# Patient Record
Sex: MALE | Race: WHITE | NOT HISPANIC OR LATINO
[De-identification: names, ages, dates, MRNs, and addresses within clinical notes are randomized per-mention and may not be internally consistent; named-entity substitution may affect disease eponyms.]

---

## 2022-08-24 PROBLEM — Z00.00 ENCOUNTER FOR PREVENTIVE HEALTH EXAMINATION: Status: ACTIVE | Noted: 2022-08-24

## 2022-08-29 ENCOUNTER — APPOINTMENT (OUTPATIENT)
Dept: ELECTROPHYSIOLOGY | Facility: CLINIC | Age: 67
End: 2022-08-29

## 2022-08-29 ENCOUNTER — NON-APPOINTMENT (OUTPATIENT)
Age: 67
End: 2022-08-29

## 2022-08-29 VITALS
SYSTOLIC BLOOD PRESSURE: 158 MMHG | HEIGHT: 72 IN | OXYGEN SATURATION: 96 % | HEART RATE: 59 BPM | DIASTOLIC BLOOD PRESSURE: 80 MMHG | TEMPERATURE: 99.4 F | BODY MASS INDEX: 21.84 KG/M2

## 2022-08-29 VITALS — WEIGHT: 161 LBS

## 2022-08-29 DIAGNOSIS — Z82.49 FAMILY HISTORY OF ISCHEMIC HEART DISEASE AND OTHER DISEASES OF THE CIRCULATORY SYSTEM: ICD-10-CM

## 2022-08-29 DIAGNOSIS — Z87.19 PERSONAL HISTORY OF OTHER DISEASES OF THE DIGESTIVE SYSTEM: ICD-10-CM

## 2022-08-29 DIAGNOSIS — Z85.828 PERSONAL HISTORY OF OTHER MALIGNANT NEOPLASM OF SKIN: ICD-10-CM

## 2022-08-29 DIAGNOSIS — Z72.89 OTHER PROBLEMS RELATED TO LIFESTYLE: ICD-10-CM

## 2022-08-29 DIAGNOSIS — R00.2 PALPITATIONS: ICD-10-CM

## 2022-08-29 PROCEDURE — 99205 OFFICE O/P NEW HI 60 MIN: CPT

## 2022-08-29 PROCEDURE — 93000 ELECTROCARDIOGRAM COMPLETE: CPT

## 2022-08-29 RX ORDER — AMLODIPINE BESYLATE 2.5 MG/1
2.5 TABLET ORAL
Qty: 90 | Refills: 3 | Status: ACTIVE | COMMUNITY
Start: 2022-08-29

## 2022-08-29 RX ORDER — METOPROLOL TARTRATE 50 MG/1
50 TABLET, FILM COATED ORAL TWICE DAILY
Qty: 180 | Refills: 1 | Status: ACTIVE | COMMUNITY
Start: 2022-08-29

## 2022-08-29 RX ORDER — ATORVASTATIN CALCIUM 40 MG/1
40 TABLET, FILM COATED ORAL
Qty: 30 | Refills: 1 | Status: ACTIVE | COMMUNITY
Start: 2022-08-29

## 2022-08-29 RX ORDER — DM/PE/ACETAMINOPHEN/CHLORPHENR 10-5-325-2
500 TABLET, SEQUENTIAL ORAL
Refills: 0 | Status: ACTIVE | COMMUNITY
Start: 2022-08-29

## 2022-08-29 RX ORDER — APIXABAN 5 MG/1
5 TABLET, FILM COATED ORAL
Qty: 60 | Refills: 5 | Status: ACTIVE | COMMUNITY
Start: 2022-08-29

## 2022-08-30 NOTE — HISTORY OF PRESENT ILLNESS
[FreeTextEntry1] : Sal Mayberry is a 67y/o man with Hx of HTN, HLD, and newly found paroxysmal afib, on Eliquis, who presents today for initial evaluation. Admits post COVID vaccine, had recurrent hospitalization for new onset palpitations and dyspnea and was noted to be in afib with RVR. Saw Cardiologist and underwent Holter monitoring which revealed recurrent afib with RVR. On metoprolol 50 BID and Eliquis for thromboembolic prophylaxis. No s/s of bleeding. Still experiencing recurrent palpitations, can wake him from sleep. Of note, had a home sleep test in 2021, normal. Denies chest pain, SOB at rest, syncope or near syncope.\par

## 2022-08-31 ENCOUNTER — OUTPATIENT (OUTPATIENT)
Dept: OUTPATIENT SERVICES | Facility: HOSPITAL | Age: 67
LOS: 1 days | End: 2022-08-31

## 2022-08-31 VITALS
OXYGEN SATURATION: 98 % | SYSTOLIC BLOOD PRESSURE: 140 MMHG | TEMPERATURE: 98 F | WEIGHT: 158.07 LBS | DIASTOLIC BLOOD PRESSURE: 80 MMHG | HEART RATE: 60 BPM | RESPIRATION RATE: 16 BRPM | HEIGHT: 71 IN

## 2022-08-31 DIAGNOSIS — I48.0 PAROXYSMAL ATRIAL FIBRILLATION: ICD-10-CM

## 2022-08-31 DIAGNOSIS — Z98.890 OTHER SPECIFIED POSTPROCEDURAL STATES: Chronic | ICD-10-CM

## 2022-08-31 LAB
ALBUMIN SERPL ELPH-MCNC: 4.9 G/DL — SIGNIFICANT CHANGE UP (ref 3.3–5)
ALP SERPL-CCNC: 61 U/L — SIGNIFICANT CHANGE UP (ref 40–120)
ALT FLD-CCNC: 22 U/L — SIGNIFICANT CHANGE UP (ref 4–41)
ANION GAP SERPL CALC-SCNC: 13 MMOL/L — SIGNIFICANT CHANGE UP (ref 7–14)
AST SERPL-CCNC: 23 U/L — SIGNIFICANT CHANGE UP (ref 4–40)
BILIRUB SERPL-MCNC: 1.1 MG/DL — SIGNIFICANT CHANGE UP (ref 0.2–1.2)
BLD GP AB SCN SERPL QL: NEGATIVE — SIGNIFICANT CHANGE UP
BUN SERPL-MCNC: 17 MG/DL — SIGNIFICANT CHANGE UP (ref 7–23)
CALCIUM SERPL-MCNC: 9.6 MG/DL — SIGNIFICANT CHANGE UP (ref 8.4–10.5)
CHLORIDE SERPL-SCNC: 101 MMOL/L — SIGNIFICANT CHANGE UP (ref 98–107)
CO2 SERPL-SCNC: 25 MMOL/L — SIGNIFICANT CHANGE UP (ref 22–31)
CREAT SERPL-MCNC: 0.69 MG/DL — SIGNIFICANT CHANGE UP (ref 0.5–1.3)
EGFR: 101 ML/MIN/1.73M2 — SIGNIFICANT CHANGE UP
GLUCOSE SERPL-MCNC: 101 MG/DL — HIGH (ref 70–99)
HCT VFR BLD CALC: 45.2 % — SIGNIFICANT CHANGE UP (ref 39–50)
HGB BLD-MCNC: 14.9 G/DL — SIGNIFICANT CHANGE UP (ref 13–17)
MCHC RBC-ENTMCNC: 31.9 PG — SIGNIFICANT CHANGE UP (ref 27–34)
MCHC RBC-ENTMCNC: 33 GM/DL — SIGNIFICANT CHANGE UP (ref 32–36)
MCV RBC AUTO: 96.8 FL — SIGNIFICANT CHANGE UP (ref 80–100)
NRBC # BLD: 0 /100 WBCS — SIGNIFICANT CHANGE UP (ref 0–0)
NRBC # FLD: 0 K/UL — SIGNIFICANT CHANGE UP (ref 0–0)
PLATELET # BLD AUTO: 153 K/UL — SIGNIFICANT CHANGE UP (ref 150–400)
POTASSIUM SERPL-MCNC: 4.2 MMOL/L — SIGNIFICANT CHANGE UP (ref 3.5–5.3)
POTASSIUM SERPL-SCNC: 4.2 MMOL/L — SIGNIFICANT CHANGE UP (ref 3.5–5.3)
PROT SERPL-MCNC: 7.8 G/DL — SIGNIFICANT CHANGE UP (ref 6–8.3)
RBC # BLD: 4.67 M/UL — SIGNIFICANT CHANGE UP (ref 4.2–5.8)
RBC # FLD: 12.3 % — SIGNIFICANT CHANGE UP (ref 10.3–14.5)
RH IG SCN BLD-IMP: POSITIVE — SIGNIFICANT CHANGE UP
SODIUM SERPL-SCNC: 139 MMOL/L — SIGNIFICANT CHANGE UP (ref 135–145)
WBC # BLD: 7.97 K/UL — SIGNIFICANT CHANGE UP (ref 3.8–10.5)
WBC # FLD AUTO: 7.97 K/UL — SIGNIFICANT CHANGE UP (ref 3.8–10.5)

## 2022-08-31 RX ORDER — ATORVASTATIN CALCIUM 80 MG/1
1 TABLET, FILM COATED ORAL
Qty: 0 | Refills: 0 | DISCHARGE

## 2022-08-31 RX ORDER — AMLODIPINE BESYLATE 2.5 MG/1
1 TABLET ORAL
Qty: 0 | Refills: 0 | DISCHARGE

## 2022-08-31 RX ORDER — METOPROLOL TARTRATE 50 MG
1 TABLET ORAL
Qty: 0 | Refills: 0 | DISCHARGE

## 2022-08-31 RX ORDER — APIXABAN 2.5 MG/1
1 TABLET, FILM COATED ORAL
Qty: 0 | Refills: 0 | DISCHARGE

## 2022-08-31 NOTE — H&P PST ADULT - HISTORY OF PRESENT ILLNESS
Pt is a 66 yr old male scheduled for A-fib Ablation with Zapper with Dr Mohamud tentatively 9/9/22 - pt c/o of onset of A-fib after 1st vaccine 3/21 - w/u by Cardio and hospitalizations with paroxysmal A-fib and recurring palpitations - pt again had symptoms waking from sleep and pt now for ablation   Pt given information for COVID PCR testing sites and told to make appointment 3-5 days preop

## 2022-08-31 NOTE — H&P PST ADULT - CARDIOVASCULAR COMMENTS
normal...
Hx paroxysmal A-fib starting post 1st vaccine for COVID - hospitalized - w/u done and pt continues to c/o of episodic palpitations including at night

## 2022-08-31 NOTE — H&P PST ADULT - PROBLEM SELECTOR PLAN 1
Pt scheduled for surgery and preop instructions including instructions as per Dr Mohamud for meds on the day of surgery, given verbally and with use of  written materials, and patient confirming understanding of such instructions using  teach back method.  Pt given information for COVID PCR testing sites and told to make appointment 3-5 days preop

## 2022-09-06 LAB — SARS-COV-2 RNA SPEC QL NAA+PROBE: SIGNIFICANT CHANGE UP

## 2022-09-09 ENCOUNTER — OUTPATIENT (OUTPATIENT)
Dept: OUTPATIENT SERVICES | Facility: HOSPITAL | Age: 67
LOS: 1 days | Discharge: HOME | End: 2022-09-09

## 2022-09-09 DIAGNOSIS — Z98.890 OTHER SPECIFIED POSTPROCEDURAL STATES: Chronic | ICD-10-CM

## 2022-09-09 DIAGNOSIS — I48.0 PAROXYSMAL ATRIAL FIBRILLATION: ICD-10-CM

## 2022-09-09 LAB — RH IG SCN BLD-IMP: POSITIVE — SIGNIFICANT CHANGE UP

## 2022-09-09 PROCEDURE — 93656 COMPRE EP EVAL ABLTJ ATR FIB: CPT

## 2022-09-09 PROCEDURE — 93623 PRGRMD STIMJ&PACG IV RX NFS: CPT | Mod: 26

## 2022-09-09 PROCEDURE — 93010 ELECTROCARDIOGRAM REPORT: CPT | Mod: 76

## 2022-09-09 PROCEDURE — 93657 TX L/R ATRIAL FIB ADDL: CPT

## 2022-09-09 RX ORDER — AMLODIPINE BESYLATE 2.5 MG/1
2.5 TABLET ORAL ONCE
Refills: 0 | Status: DISCONTINUED | OUTPATIENT
Start: 2022-09-09 | End: 2022-09-09

## 2022-09-09 RX ORDER — PANTOPRAZOLE SODIUM 20 MG/1
1 TABLET, DELAYED RELEASE ORAL
Qty: 30 | Refills: 0
Start: 2022-09-09 | End: 2022-10-08

## 2022-09-09 RX ORDER — AMLODIPINE BESYLATE 2.5 MG/1
2.5 TABLET ORAL ONCE
Refills: 0 | Status: COMPLETED | OUTPATIENT
Start: 2022-09-09 | End: 2022-09-09

## 2022-09-09 RX ORDER — METOPROLOL TARTRATE 50 MG
50 TABLET ORAL ONCE
Refills: 0 | Status: COMPLETED | OUTPATIENT
Start: 2022-09-09 | End: 2022-09-09

## 2022-09-09 RX ADMIN — Medication 50 MILLIGRAM(S): at 15:32

## 2022-09-09 RX ADMIN — AMLODIPINE BESYLATE 2.5 MILLIGRAM(S): 2.5 TABLET ORAL at 16:48

## 2022-09-09 NOTE — CHART NOTE - NSCHARTNOTEFT_GEN_A_CORE
The patient presents today for atrial fibrillation ablation  See H&P from presurgical testing.   The patient denies any new complaints since the last time he was seen by Dr. Mohamud.  Medications reviewed.    NPO Date and Time:  Mallampati:  Anticoagulation Date and Time? N/A  Previous Endoscopy?  NO  Hx of CVA?  NO  Sleep Apnea?  NO  Dentures? NO  Loose Teeth? NO      Patient Denies:    Prior difficult intubation or airway problems  Odynophagia  Dysphagia  Esophageal stricture  Esophageal tumor  Esophageal varices  Esophageal perforation/laceration  Esophageal diverticulum  Large diaphragmatic Hernia  Active or recent upper gastrointestinal (GI) bleed  History of GI surgery  History of Esophageal surgery  History of Rodríguez's esophagus  Tenuous cardiorespiratory status  Cervical spine arthritis with reduced range of motion or atlantoaxial joint disease. History of radiation to head, neck, or mediastinum  Severe thrombocytopenia  Obstructive sleep apnea The patient presents today for atrial fibrillation ablation  See H&P from presurgical testing.   The patient denies any new complaints since the last time he was seen by Dr. Mohamud.  Medications reviewed.    NPO Date and Time:  9/8/22 at 1600  Mallampati: 1  Anticoagulation Date and Time? Eliquis on 9/8/22 at 1900  Previous Endoscopy?  NO  Hx of CVA?  NO  Sleep Apnea?  NO  Dentures? NO  Loose Teeth? NO      Patient Denies:    Prior difficult intubation or airway problems  Odynophagia  Dysphagia  Esophageal stricture  Esophageal tumor  Esophageal varices  Esophageal perforation/laceration  Esophageal diverticulum  Large diaphragmatic Hernia  Active or recent upper gastrointestinal (GI) bleed  History of GI surgery  History of Esophageal surgery  History of Rodríguez's esophagus  Tenuous cardiorespiratory status  Cervical spine arthritis with reduced range of motion or atlantoaxial joint disease. History of radiation to head, neck, or mediastinum  Severe thrombocytopenia  Obstructive sleep apnea

## 2022-09-09 NOTE — CHART NOTE - NSCHARTNOTEFT_GEN_A_CORE
christiano -170's with zeroing, discussed with Dr Mohamud, given Metoprolol 50mg po x1 and will give evening dose of Norvasc 2.5mg po x1 prior to discharge  RN may remove christiano and plan for discharge to home

## 2022-09-09 NOTE — CHART NOTE - NSCHARTNOTEFT_GEN_A_CORE
This is a 66 year old man with a pmhx of significant for pAfib with palpitations, HLD, and HTN who presented today for elective Afib ablation s/p ablation on 9/9/2022     Physical Exam:  General: Alert, NAD, lying in bed  Right groin was covered with a  clear with a gauge and clear Tegaderm. It was clean, dry, and intact. No bleeding, drainage hematoma, ecchymosis, or bruit appreciated.      Plan:  s/p Ablation:  Post-op Afib  ablation instruction has been verbally explained and given to the patient. Patient expressed understanding and all questions were answered   Patient is schedule for an appointment on 10/20/222 at 12:15pm ( 4th floor Oncology [facility] [address], [place], [place], [address] [phone] )  Remove the bandage after 24-48 hours  No scrubbing the incision site for 7 days   No lifting more than 5lb or exertional exercising such as jogging, running, bike riding for 7 days  No swimming pool, Jacuzzi, or bath for 5 days.   Patient can shower 24 hours after procedure . Pat the area dry  Pt was instructed to call [phone] if the following occurs:      - fever with temperature > 100.6      - swelling, drainage or bleeding at the site incision       - chest pain, SOB, n/v    Selene Powell PA-C

## 2022-10-20 ENCOUNTER — APPOINTMENT (OUTPATIENT)
Dept: ELECTROPHYSIOLOGY | Facility: CLINIC | Age: 67
End: 2022-10-20

## 2022-10-20 VITALS
HEART RATE: 68 BPM | SYSTOLIC BLOOD PRESSURE: 159 MMHG | HEIGHT: 72 IN | OXYGEN SATURATION: 94 % | WEIGHT: 163 LBS | DIASTOLIC BLOOD PRESSURE: 90 MMHG | BODY MASS INDEX: 22.08 KG/M2

## 2022-10-20 PROBLEM — I10 ESSENTIAL (PRIMARY) HYPERTENSION: Chronic | Status: ACTIVE | Noted: 2022-08-31

## 2022-10-20 PROBLEM — I48.91 UNSPECIFIED ATRIAL FIBRILLATION: Chronic | Status: ACTIVE | Noted: 2022-08-31

## 2022-10-20 PROBLEM — E78.5 HYPERLIPIDEMIA, UNSPECIFIED: Chronic | Status: ACTIVE | Noted: 2022-08-31

## 2022-10-20 PROCEDURE — 93000 ELECTROCARDIOGRAM COMPLETE: CPT

## 2022-10-20 PROCEDURE — 99215 OFFICE O/P EST HI 40 MIN: CPT | Mod: 25

## 2022-10-20 RX ORDER — PANTOPRAZOLE 40 MG/1
40 TABLET, DELAYED RELEASE ORAL
Qty: 30 | Refills: 0 | Status: DISCONTINUED | COMMUNITY
Start: 2022-09-09

## 2022-10-22 ENCOUNTER — NON-APPOINTMENT (OUTPATIENT)
Age: 67
End: 2022-10-22

## 2022-10-22 NOTE — DISCUSSION/SUMMARY
[EKG obtained to assist in diagnosis and management of assessed problem(s)] : EKG obtained to assist in diagnosis and management of assessed problem(s) [FreeTextEntry1] : Impression:\par \par 1. Paroxysmal afib: s/p PVI and CTI ablation on 9/9/2022. EKG performed today to assess for presence of recurrent afib and reveals sinus bradycardia. ECHO with normal LVEF. Recurrent brief palpitations in blanking period, possible apc's. Resume metoprolol and Eliquis as prescribed. \par \par 2. HTN: resume oral antihypertensives as prescribed. Encouraged heart healthy diet, sodium restriction, and weight loss. Continue regular f/u with Cardiologist for further HTN management.\par \par 3. HLD: resume statin therapy as prescribed and regular f/u with Cardiologist for routine lipid monitoring and management.\par \par Continue f/u with Cardiologist and RTO for f/u in 6 months.

## 2022-10-22 NOTE — HISTORY OF PRESENT ILLNESS
[FreeTextEntry1] : Sal Mayberry is a 65y/o man with Hx of HTN, HLD, and newly found paroxysmal afib, on Eliquis, now s/p PVI and CTI ablation on 9/9/2022 who presents today for routine f/u post ablation. Admits doing well post ablation. Just recently started feeling a quick palpitation, more often in the evening. Can feel it when he lays down to go to sleep. Unsure if extra beats. Remains on metoprolol and Eliquis without s/s of bleeding. Right groin without pain, swelling, or bruising. Denies chest pain, SOB, syncope or near syncope.\par

## 2023-01-19 ENCOUNTER — APPOINTMENT (OUTPATIENT)
Dept: ELECTROPHYSIOLOGY | Facility: CLINIC | Age: 68
End: 2023-01-19

## 2023-03-06 ENCOUNTER — NON-APPOINTMENT (OUTPATIENT)
Age: 68
End: 2023-03-06

## 2023-03-06 ENCOUNTER — APPOINTMENT (OUTPATIENT)
Dept: ELECTROPHYSIOLOGY | Facility: CLINIC | Age: 68
End: 2023-03-06
Payer: MEDICARE

## 2023-03-06 VITALS
SYSTOLIC BLOOD PRESSURE: 143 MMHG | OXYGEN SATURATION: 97 % | DIASTOLIC BLOOD PRESSURE: 83 MMHG | HEART RATE: 72 BPM | HEIGHT: 72 IN | BODY MASS INDEX: 23.33 KG/M2

## 2023-03-06 VITALS — BODY MASS INDEX: 23.33 KG/M2 | WEIGHT: 172 LBS

## 2023-03-06 DIAGNOSIS — Z86.79 OTHER SPECIFIED POSTPROCEDURAL STATES: ICD-10-CM

## 2023-03-06 DIAGNOSIS — I48.0 PAROXYSMAL ATRIAL FIBRILLATION: ICD-10-CM

## 2023-03-06 DIAGNOSIS — Z98.890 OTHER SPECIFIED POSTPROCEDURAL STATES: ICD-10-CM

## 2023-03-06 DIAGNOSIS — I10 ESSENTIAL (PRIMARY) HYPERTENSION: ICD-10-CM

## 2023-03-06 DIAGNOSIS — E78.5 HYPERLIPIDEMIA, UNSPECIFIED: ICD-10-CM

## 2023-03-06 PROCEDURE — 93000 ELECTROCARDIOGRAM COMPLETE: CPT

## 2023-03-06 PROCEDURE — 99215 OFFICE O/P EST HI 40 MIN: CPT | Mod: 25

## 2023-03-07 NOTE — DISCUSSION/SUMMARY
[EKG obtained to assist in diagnosis and management of assessed problem(s)] : EKG obtained to assist in diagnosis and management of assessed problem(s) [FreeTextEntry1] : Impression:\par \par 1. Paroxysmal afib: s/p PVI and CTI ablation on 9/9/2022. EKG performed today to assess for presence of recurrent afib and reveals sinus bradycardia. ECHO with normal LVEF. Resume metoprolol and Eliquis as prescribed. Concern about long term use of anticoagulation. Recommend undergoing placement of ILR for long term monitoring of afib and to allow for safe discontinuation of AC in future. Risks, benefits, and alternatives discussed. Will discuss with Cardiologist. Consider 30 day CardioNet first. \par \par 2. HTN: resume oral antihypertensives as prescribed. Encouraged heart healthy diet, sodium restriction, and weight loss. Continue regular f/u with Cardiologist for further HTN management.\par \par 3. HLD: resume statin therapy as prescribed and regular f/u with Cardiologist for routine lipid monitoring and management.\par \par Plan for 30 day CardioNet. \par Continue f/u with Cardiologist and RTO for f/u in 6 months.

## 2023-03-07 NOTE — HISTORY OF PRESENT ILLNESS
[FreeTextEntry1] : Sal Mayberry is a 68y/o man with Hx of HTN, HLD, and newly found paroxysmal afib, on Eliquis, now s/p PVI and CTI ablation on 9/9/2022 who presents today for routine f/u. Admits doing well. Denies chest pain, palpitations, SOB, syncope or near syncope. Remains on metoprolol and Eliquis without s/s of bleeding. Had recent Holter with Cardiologist, unsure of results.